# Patient Record
Sex: FEMALE | Race: ASIAN | NOT HISPANIC OR LATINO | URBAN - METROPOLITAN AREA
[De-identification: names, ages, dates, MRNs, and addresses within clinical notes are randomized per-mention and may not be internally consistent; named-entity substitution may affect disease eponyms.]

---

## 2019-02-27 ENCOUNTER — EMERGENCY (EMERGENCY)
Facility: HOSPITAL | Age: 49
LOS: 1 days | Discharge: HOME | End: 2019-02-27
Attending: EMERGENCY MEDICINE | Admitting: EMERGENCY MEDICINE
Payer: MEDICAID

## 2019-02-27 VITALS
HEART RATE: 77 BPM | OXYGEN SATURATION: 99 % | TEMPERATURE: 98 F | DIASTOLIC BLOOD PRESSURE: 97 MMHG | SYSTOLIC BLOOD PRESSURE: 164 MMHG | RESPIRATION RATE: 16 BRPM

## 2019-02-27 LAB
ALBUMIN SERPL ELPH-MCNC: 4.3 G/DL — SIGNIFICANT CHANGE UP (ref 3.3–5)
ALP SERPL-CCNC: 94 U/L — SIGNIFICANT CHANGE UP (ref 40–120)
ALT FLD-CCNC: 30 U/L — SIGNIFICANT CHANGE UP (ref 4–33)
ANION GAP SERPL CALC-SCNC: 13 MMO/L — SIGNIFICANT CHANGE UP (ref 7–14)
AST SERPL-CCNC: 19 U/L — SIGNIFICANT CHANGE UP (ref 4–32)
BASOPHILS # BLD AUTO: 0.07 K/UL — SIGNIFICANT CHANGE UP (ref 0–0.2)
BASOPHILS NFR BLD AUTO: 0.7 % — SIGNIFICANT CHANGE UP (ref 0–2)
BILIRUB SERPL-MCNC: 0.5 MG/DL — SIGNIFICANT CHANGE UP (ref 0.2–1.2)
BUN SERPL-MCNC: 8 MG/DL — SIGNIFICANT CHANGE UP (ref 7–23)
CALCIUM SERPL-MCNC: 9.6 MG/DL — SIGNIFICANT CHANGE UP (ref 8.4–10.5)
CHLORIDE SERPL-SCNC: 101 MMOL/L — SIGNIFICANT CHANGE UP (ref 98–107)
CO2 SERPL-SCNC: 25 MMOL/L — SIGNIFICANT CHANGE UP (ref 22–31)
CREAT SERPL-MCNC: 0.66 MG/DL — SIGNIFICANT CHANGE UP (ref 0.5–1.3)
EOSINOPHIL # BLD AUTO: 0.13 K/UL — SIGNIFICANT CHANGE UP (ref 0–0.5)
EOSINOPHIL NFR BLD AUTO: 1.3 % — SIGNIFICANT CHANGE UP (ref 0–6)
GLUCOSE SERPL-MCNC: 116 MG/DL — HIGH (ref 70–99)
HCT VFR BLD CALC: 39.4 % — SIGNIFICANT CHANGE UP (ref 34.5–45)
HGB BLD-MCNC: 12.3 G/DL — SIGNIFICANT CHANGE UP (ref 11.5–15.5)
IMM GRANULOCYTES NFR BLD AUTO: 0.7 % — SIGNIFICANT CHANGE UP (ref 0–1.5)
LYMPHOCYTES # BLD AUTO: 3.11 K/UL — SIGNIFICANT CHANGE UP (ref 1–3.3)
LYMPHOCYTES # BLD AUTO: 30.5 % — SIGNIFICANT CHANGE UP (ref 13–44)
MCHC RBC-ENTMCNC: 24.2 PG — LOW (ref 27–34)
MCHC RBC-ENTMCNC: 31.2 % — LOW (ref 32–36)
MCV RBC AUTO: 77.4 FL — LOW (ref 80–100)
MONOCYTES # BLD AUTO: 0.72 K/UL — SIGNIFICANT CHANGE UP (ref 0–0.9)
MONOCYTES NFR BLD AUTO: 7.1 % — SIGNIFICANT CHANGE UP (ref 2–14)
NEUTROPHILS # BLD AUTO: 6.11 K/UL — SIGNIFICANT CHANGE UP (ref 1.8–7.4)
NEUTROPHILS NFR BLD AUTO: 59.7 % — SIGNIFICANT CHANGE UP (ref 43–77)
NRBC # FLD: 0 K/UL — LOW (ref 25–125)
PLATELET # BLD AUTO: 333 K/UL — SIGNIFICANT CHANGE UP (ref 150–400)
PMV BLD: 10.9 FL — SIGNIFICANT CHANGE UP (ref 7–13)
POTASSIUM SERPL-MCNC: 3.8 MMOL/L — SIGNIFICANT CHANGE UP (ref 3.5–5.3)
POTASSIUM SERPL-SCNC: 3.8 MMOL/L — SIGNIFICANT CHANGE UP (ref 3.5–5.3)
PROT SERPL-MCNC: 7.9 G/DL — SIGNIFICANT CHANGE UP (ref 6–8.3)
RBC # BLD: 5.09 M/UL — SIGNIFICANT CHANGE UP (ref 3.8–5.2)
RBC # FLD: 15.3 % — HIGH (ref 10.3–14.5)
SODIUM SERPL-SCNC: 139 MMOL/L — SIGNIFICANT CHANGE UP (ref 135–145)
TROPONIN T, HIGH SENSITIVITY: < 6 NG/L — SIGNIFICANT CHANGE UP (ref ?–14)
WBC # BLD: 10.21 K/UL — SIGNIFICANT CHANGE UP (ref 3.8–10.5)
WBC # FLD AUTO: 10.21 K/UL — SIGNIFICANT CHANGE UP (ref 3.8–10.5)

## 2019-02-27 PROCEDURE — 93010 ELECTROCARDIOGRAM REPORT: CPT

## 2019-02-27 PROCEDURE — 70450 CT HEAD/BRAIN W/O DYE: CPT | Mod: 26

## 2019-02-27 PROCEDURE — 99284 EMERGENCY DEPT VISIT MOD MDM: CPT | Mod: 25

## 2019-02-27 RX ORDER — METOCLOPRAMIDE HCL 10 MG
10 TABLET ORAL ONCE
Qty: 0 | Refills: 0 | Status: COMPLETED | OUTPATIENT
Start: 2019-02-27 | End: 2019-02-27

## 2019-02-27 RX ORDER — MECLIZINE HCL 12.5 MG
25 TABLET ORAL ONCE
Qty: 0 | Refills: 0 | Status: COMPLETED | OUTPATIENT
Start: 2019-02-27 | End: 2019-02-27

## 2019-02-27 RX ORDER — SODIUM CHLORIDE 9 MG/ML
2000 INJECTION INTRAMUSCULAR; INTRAVENOUS; SUBCUTANEOUS ONCE
Qty: 0 | Refills: 0 | Status: COMPLETED | OUTPATIENT
Start: 2019-02-27 | End: 2019-02-27

## 2019-02-27 RX ORDER — METOCLOPRAMIDE HCL 10 MG
1 TABLET ORAL
Qty: 12 | Refills: 0 | OUTPATIENT
Start: 2019-02-27 | End: 2019-03-01

## 2019-02-27 RX ORDER — MECLIZINE HCL 12.5 MG
1 TABLET ORAL
Qty: 15 | Refills: 0 | OUTPATIENT
Start: 2019-02-27 | End: 2019-03-03

## 2019-02-27 RX ADMIN — Medication 10 MILLIGRAM(S): at 12:03

## 2019-02-27 RX ADMIN — SODIUM CHLORIDE 2000 MILLILITER(S): 9 INJECTION INTRAMUSCULAR; INTRAVENOUS; SUBCUTANEOUS at 12:03

## 2019-02-27 RX ADMIN — Medication 25 MILLIGRAM(S): at 12:03

## 2019-02-27 NOTE — ED PROVIDER NOTE - CLINICAL SUMMARY MEDICAL DECISION MAKING FREE TEXT BOX
49 Y/O F PMH HTN C/O 2 days of room spinning dizz, only neuro finding is fatiguable L beating nystagmus, pt seen by neurology resident in ED who feels this is peripheral. Sh

## 2019-02-27 NOTE — ED PROVIDER NOTE - NSFOLLOWUPINSTRUCTIONS_ED_ALL_ED_FT
Follow up with your primary doctor, a cardiologist and a neurologist. If you do not have these specialties call  for an appointment. Meclizine has been sent to your pharmacy, take this every 8 hours as for dizziness for 5 days and take reglan every 6 hours as needed for nausea. Advance activity as tolerated.  Continue all previously prescribed medications as directed.  Follow up with your primary care physician in 48-72 hours- bring copies of your results.  Return to the ER for worsening or persistent symptoms, and/or ANY NEW OR CONCERNING SYMPTOMS. If you have issues obtaining follow up, please call: [phone] to obtain a doctor or specialist who takes your insurance in your area.  You may call [phone] to make an appointment with the internal medicine clinic.

## 2019-02-27 NOTE — CONSULT NOTE ADULT - NSHPATTENDINGPLANDISCUSS_GEN_ALL_CORE
neurology resident as above and I agree with assessment and plan and outpatient neurology follow up.

## 2019-02-27 NOTE — ED PROVIDER NOTE - OBJECTIVE STATEMENT
49 Y/O F PMH HTN C/O 2 days of room spinning dizz also notes subjective numbness to scalp. Pt states it is worsened when she stands or turns her head. She has been having nausea and vomiting, has no H/O vertigo, denies trauma. Denies any other symptoms or acute complaints such as CP SOB ABD PN N V D Dizz or syncope.

## 2019-02-27 NOTE — ED PROVIDER NOTE - ATTENDING CONTRIBUTION TO CARE
45 yo female no significant past medical hx, c/o onset of severe dizziness, room spinning, worsening with head movement. denies headache, focal numbness and weakness. /97 P 97 RR 18 sat 97 RA HEENT PERRL EOMI, positional change of head produces symptoms, no nystagmus, F-N H-S CORNELL well done. no neck stiffness, motor 5/5, no facial assymetry. Imp: vertigo plan meclizine, IV hydration CT of head, re-assess, consider neuro consult

## 2019-02-27 NOTE — ED ADULT NURSE NOTE - NSIMPLEMENTINTERV_GEN_ALL_ED
Implemented All Universal Safety Interventions:  [place] to call system. Call bell, personal items and telephone within reach. Instruct patient to call for assistance. Room bathroom lighting operational. Non-slip footwear when patient is off stretcher. Physically safe environment: no spills, clutter or unnecessary equipment. Stretcher in lowest position, wheels locked, appropriate side rails in place.

## 2019-02-27 NOTE — CONSULT NOTE ADULT - ASSESSMENT
47 yearold woman no significant PMH presents with vertigo. Patient states that it started 2 days ago and is worse upon movement. States that the room starts to spin. Denies weakness or numbness, headache, or vision changes.  On neuro exam patient has left sided nystagmus and +rosy hallpike. CTH negative    IMpression: BPPV    Plan:  Epley maneuver done at bedside  Patient can follow up with outpatient neurology at [facility] if symptoms persist. 49 yearold woman no significant PMH presents with vertigo. Patient states that it started 2 days ago and is worse upon movement. States that the room starts to spin. Denies weakness or numbness, headache, or vision changes.  On neuro exam patient has left sided nystagmus and +rosy hallpike. CTH negative    IMpression: BPPV    Plan:  Epley maneuver done at bedside  Patient can follow up with outpatient neurology at [facility] if symptoms persist. [phone]  All questions addressed

## 2019-02-27 NOTE — CONSULT NOTE ADULT - SUBJECTIVE AND OBJECTIVE BOX
Neurology Consult    Name  OFELIA JIM    HPI: 49 yearold woman no significant PMH presents with vertigo. Patient states that it started 2 days ago and is worse upon movement. States that the room starts to spin. Denies weakness or numbness, headache, or vision changes.           MEDICATIONS  (STANDING):    MEDICATIONS  (PRN):      Allergies    No Known Allergies    Intolerances        Objective:   Vital Signs Last 24 Hrs  T(C): 36.4 (27 Feb 2019 11:17), Max: 36.4 (27 Feb 2019 11:17)  T(F): 97.6 (27 Feb 2019 11:17), Max: 97.6 (27 Feb 2019 11:17)  HR: 77 (27 Feb 2019 11:17) (77 - 77)  BP: 164/97 (27 Feb 2019 11:17) (164/97 - 164/97)  BP(mean): --  RR: 16 (27 Feb 2019 11:17) (16 - 16)  SpO2: 99% (27 Feb 2019 11:17) (99% - 99%)    General Exam:   General appearance: No acute distress                   Neurological Exam:  Mental Status: AAOx3, fluent speech, follows commands    Cranial Nerves: EOMI, few beats of nystagmus to the left, PERRL, V1-V3 intact, facial symmetry intact, no dysarthria, tongue midline    Motor: No drift x4    Sensation: Intact to LT throughout    Coordination: FTN intact b/l      rosy hallpike neg    Labs:    02-27    139  |  101  |  8   ----------------------------<  116<H>  3.8   |  25  |  0.66    Ca    9.6      27 Feb 2019 12:19    TPro  7.9  /  Alb  4.3  /  TBili  0.5  /  DBili  x   /  AST  19  /  ALT  30  /  AlkPhos  94  02-27    LIVER FUNCTIONS - ( 27 Feb 2019 12:19 )  Alb: 4.3 g/dL / Pro: 7.9 g/dL / ALK PHOS: 94 u/L / ALT: 30 u/L / AST: 19 u/L / GGT: x           CBC Full  -  ( 27 Feb 2019 12:19 )  WBC Count : 10.21 K/uL  Hemoglobin : 12.3 g/dL  Hematocrit : 39.4 %  Platelet Count - Automated : 333 K/uL  Mean Cell Volume : 77.4 fL  Mean Cell Hemoglobin : 24.2 pg  Mean Cell Hemoglobin Concentration : 31.2 %  Auto Neutrophil # : 6.11 K/uL  Auto Lymphocyte # : 3.11 K/uL  Auto Monocyte # : 0.72 K/uL  Auto Eosinophil # : 0.13 K/uL  Auto Basophil # : 0.07 K/uL  Auto Neutrophil % : 59.7 %  Auto Lymphocyte % : 30.5 %  Auto Monocyte % : 7.1 %  Auto Eosinophil % : 1.3 %  Auto Basophil % : 0.7 %      Radiology  CTH: IMPRESSION:  No interval change. No acute intracranial pathology

## 2019-02-27 NOTE — ED PROVIDER NOTE - PROGRESS NOTE DETAILS
CHAVA Ann: Neurology was already contacted, states they will see pt in ED. CHAVA constantino: Pt feels better seen and cleared by neuro pt to F/U with cardiology and neurology outpatient will be given return precautions. Pt offered translation phone but states she prefers her daughter.

## 2019-02-27 NOTE — ED PROVIDER NOTE - CRANIAL NERVE AND PUPILLARY EXAM
Mild L beating nystagmus/cranial nerves 2-12 intact/central and peripheral vision intact/extra-ocular movements intact/tongue is midline

## 2019-02-27 NOTE — ED ADULT NURSE NOTE - OBJECTIVE STATEMENT
pt received to room 25 pt comes to ED for dizziness and vomiting x 1 day. pt denies any PMHX pt VSS 20 g placed in R AC labs were drawn and sent EDMD at bedside for eval meds ordered and given awaiting further orders Will continue to monitor the pt

## 2022-04-06 ENCOUNTER — EMERGENCY (EMERGENCY)
Facility: HOSPITAL | Age: 52
LOS: 1 days | Discharge: HOME | End: 2022-04-06
Attending: EMERGENCY MEDICINE | Admitting: EMERGENCY MEDICINE
Payer: MEDICAID

## 2022-04-06 VITALS
DIASTOLIC BLOOD PRESSURE: 85 MMHG | SYSTOLIC BLOOD PRESSURE: 138 MMHG | TEMPERATURE: 97 F | HEART RATE: 94 BPM | OXYGEN SATURATION: 100 % | RESPIRATION RATE: 18 BRPM

## 2022-04-06 PROCEDURE — 28470 CLTX METATARSAL FX WO MNP EA: CPT | Mod: 54

## 2022-04-06 PROCEDURE — 99284 EMERGENCY DEPT VISIT MOD MDM: CPT | Mod: 57

## 2022-04-06 PROCEDURE — 73630 X-RAY EXAM OF FOOT: CPT | Mod: 26,LT

## 2022-04-06 PROCEDURE — 73610 X-RAY EXAM OF ANKLE: CPT | Mod: 26,LT

## 2022-04-06 NOTE — ED PROVIDER NOTE - NSFOLLOWUPINSTRUCTIONS_ED_ALL_ED_FT
1.  Please do not remove your foot from the splint.    2.  Do not bear weight on your broken foot.   3.  Return to care for worsening pain, numbness, tingling, redness, swelling.   4.  Take tylenol 500 mg every 6-8 hours as needed for pain.   5.  Follow with Podiatrist Dr. Joseph Waterhouse in [place] ([phone]) as early as able.

## 2022-04-06 NOTE — ED PROVIDER NOTE - PATIENT PORTAL LINK FT
You can access the FollowMyHealth Patient Portal offered by [facility] by registering at the following website: [email or web address]. By joining [facility]’s FollowMyHealth portal, you will also be able to view your health information using other applications (apps) compatible with our system.

## 2022-04-06 NOTE — ED PROVIDER NOTE - CLINICAL SUMMARY MEDICAL DECISION MAKING FREE TEXT BOX
50 yo F no sig pmh presents with left foot pain after injury 1 week ago.  Exam with forefoot swelling and tenderness at base of fifth metatarsal.  Suspect closed fracture vs. sprain.  Plan for x-ray, analgesia. Dispo pending.

## 2022-04-06 NOTE — ED PROVIDER NOTE - PHYSICAL EXAMINATION
GEN:  Non-toxic appearing, non-distressed, speaking full sentences, non-diaphoretic, AAOx3  HEENT:  NCAT, neck supple, EOMI, PERRLA, sclera anicteric, no conjunctival pallor or injection, no stridor, normal voice, no tonsillar exudate, uvula midline  CV:  regular rhythm and rate, s1/s2 audible, no murmurs, rubs or gallops, peripheral pulses 2+ and symmetric  PULM:  non-labored respirations, lungs clear to auscultation bilaterally, no wheezes, crackles or rales  ABD:  non distended, non-tender, no rebound, no guarding, negative Diaz's sign, bowel sounds normal, no cvat  MSK:  no gross deformity, mild swelling left forefoot, tenderness at base of fifth metatarsal, range of motion grossly normal appearing, no extremity edema, extremities warm and well perfused   NEURO:  AAOx3, CN II-XII intact, motor 5/5 in upper and lower extremities bilaterally, sensation grossly intact in extremities and trunk, finger to nose testing wnl, no nystagmus, negative Romberg, no pronator drift, no gait deficit  SKIN:  warm, dry, no rash or vesicles

## 2022-04-06 NOTE — ED PROVIDER NOTE - OBJECTIVE STATEMENT
52 yo F no sig pmh presents with left foot pain.  Approximately one week ago patient twisted left foot when walking down stairs, denies other injury.  Since then has been having persistent pain in left fifth metatarsal area, worse when walking.  Denies numbness, tingling, weakness, skin changes.

## 2022-04-19 ENCOUNTER — EMERGENCY (EMERGENCY)
Facility: HOSPITAL | Age: 52
LOS: 1 days | Discharge: HOME | End: 2022-04-19
Attending: EMERGENCY MEDICINE | Admitting: EMERGENCY MEDICINE
Payer: MEDICAID

## 2022-04-19 VITALS
TEMPERATURE: 97 F | SYSTOLIC BLOOD PRESSURE: 136 MMHG | OXYGEN SATURATION: 100 % | DIASTOLIC BLOOD PRESSURE: 82 MMHG | HEART RATE: 106 BPM | RESPIRATION RATE: 18 BRPM

## 2022-04-19 PROCEDURE — 73630 X-RAY EXAM OF FOOT: CPT | Mod: 26,LT

## 2022-04-19 PROCEDURE — 99284 EMERGENCY DEPT VISIT MOD MDM: CPT

## 2022-04-19 NOTE — ED PROVIDER NOTE - IV ALTEPLASE ADMIN OUTSIDE HIDDEN
----- Message from Pita Wells sent at 11/5/2020  2:43 PM CST -----  Type: Needs Medical Advice  Who Called: Patient  Pharmacy name and phone #:  [facility]  Best Call Back Number: [phone] (home)   Additional Information: the patient said that she needs a Rx Refilled for Fluoxetine      
Pt has annual visit scheduled 11.24.2020.  Refill pended as requested.  Thank you!  
show

## 2022-04-19 NOTE — ED PROVIDER NOTE - OBJECTIVE STATEMENT
52 y/o female presents to the ED with a known fracture in the left foot for 1 month. She endorses having aches in her foot. Pt reports that she has had a splint placed in since then. She has a outpatient podiatry follow up. Pt is due for a boot placement tomorrow but is concerned because there is not X-ray machine at their place. She would like repeat imaging before a permanent boot placement. Pt denies any changes within in the last month, numbness, and tingling. 52 y/o female presents to the ED with a known fracture in the left foot for 1 month. She endorses having aches in her foot. Pt reports that she has had a splint placed since then. She has a outpatient podiatry follow up. Pt is due for a boot placement tomorrow but is concerned because there is not X-ray machine at their place. She would like repeat imaging before a permanent boot placement. Pt denies any changes within in the last month, numbness, and tingling.

## 2022-04-19 NOTE — ED PROVIDER NOTE - PATIENT PORTAL LINK FT
You can access the FollowMyHealth Patient Portal offered by [facility] by registering at the following website: [email or web address]. By joining [facility]’s FollowMyHealth portal, you will also be able to view your health information using other applications (apps) compatible with our system.

## 2022-04-19 NOTE — ED PROVIDER NOTE - CLINICAL SUMMARY MEDICAL DECISION MAKING FREE TEXT BOX
52 y/o female presents to the ED with a known fracture in the left foot for 1 month. Pt requesting repeat imaging before boot placement. Will obtain X-ray for bony union vs. gross displacement of fragments. Pt denies pain medications. Will discharge following imaging with the given podiatry follow up.

## 2022-10-26 NOTE — ED ADULT TRIAGE NOTE - ESI TRIAGE ACUITY LEVEL, MLM
Spoke to pt and is requesting for a reference letter from MD to submit to [employer]. MD made aware.   E-advice message sent to pt.   4
